# Patient Record
(demographics unavailable — no encounter records)

---

## 2024-11-05 NOTE — DISCUSSION/SUMMARY
[FreeTextEntry1] : 1.  Shortness of breath: EKG reviewed normal sinus rhythm with anterior fascicular block.  Going to rule out ischemic heart disease in this middle-aged male with known cardiovascular disease and exertional shortness of breath.  Will advise once results of exercise nuclear stress test is known as well as an echocardiogram. 2.  Diabetes: Advised to continue oral hypoglycemics and following up with primary care physician 3.  Hypertension: Adequate control on current medication advised to continue 4.  Intermittent claudication: Will refer for a repeat arterial Doppler bilateral lower extremities and will advise once results are known.  For now continue aspirin 5.  Hyperlipidemia: Continue statin [EKG obtained to assist in diagnosis and management of assessed problem(s)] : EKG obtained to assist in diagnosis and management of assessed problem(s)

## 2024-11-05 NOTE — PHYSICAL EXAM

## 2024-11-05 NOTE — HISTORY OF PRESENT ILLNESS
[FreeTextEntry1] : 48-year-old male with past medical history of peripheral vascular disease status post balloon angioplasty of the left femoral artery, hypertension hyperlipidemia comes in for evaluation of exertional shortness of breath.  Patient reports shortness of breath described mostly as difficulty taking a deep breath occurs while seated or lying down rarely when upright and working out.  Patient works out a few days a week without exacerbation of symptoms nor association with chest discomfort PND orthopnea

## 2025-01-31 NOTE — ADDENDUM
[FreeTextEntry1] : Next Visit: PVR, Uroflow  Entered by Snehal Milton, acting as scribe and chaperone for Dr. Donald Lim.   The documentation recorded by the scribe accurately reflects the service I personally performed and the decisions made by me.

## 2025-01-31 NOTE — PHYSICAL EXAM
[Normal Appearance] : normal appearance [Well Groomed] : well groomed [General Appearance - In No Acute Distress] : no acute distress [Edema] : no peripheral edema [Respiration, Rhythm And Depth] : normal respiratory rhythm and effort [Abdomen Soft] : soft [Exaggerated Use Of Accessory Muscles For Inspiration] : no accessory muscle use [Abdomen Tenderness] : non-tender [Costovertebral Angle Tenderness] : no ~M costovertebral angle tenderness [Urinary Bladder Findings] : the bladder was normal on palpation [Normal Station and Gait] : the gait and station were normal for the patient's age [] : no rash [No Focal Deficits] : no focal deficits [Oriented To Time, Place, And Person] : oriented to person, place, and time [Affect] : the affect was normal [Mood] : the mood was normal [No Palpable Adenopathy] : no palpable adenopathy [Urethral Meatus] : meatus normal [Penis Abnormality] : normal circumcised penis [Scrotum] : the scrotum was normal [Epididymis] : the epididymides were normal [Testes Tenderness] : no tenderness of the testes [Testes Mass (___cm)] : there were no testicular masses [de-identified] : some discomfort on palpation of distal dorsal penile skin - no erythema, lesions

## 2025-01-31 NOTE — LETTER BODY
[Dear  ___] : Dear  [unfilled], [Courtesy Letter:] : I had the pleasure of seeing your patient, [unfilled], in my office today. [Please see my note below.] : Please see my note below. [Consult Closing:] : Thank you very much for allowing me to participate in the care of this patient.  If you have any questions, please do not hesitate to contact me. [Sincerely,] : Sincerely, [FreeTextEntry3] : Donald Lim MD

## 2025-01-31 NOTE — PHYSICAL EXAM
[Normal Appearance] : normal appearance [Well Groomed] : well groomed [General Appearance - In No Acute Distress] : no acute distress [Edema] : no peripheral edema [Respiration, Rhythm And Depth] : normal respiratory rhythm and effort [Exaggerated Use Of Accessory Muscles For Inspiration] : no accessory muscle use [Abdomen Soft] : soft [Abdomen Tenderness] : non-tender [Costovertebral Angle Tenderness] : no ~M costovertebral angle tenderness [Urinary Bladder Findings] : the bladder was normal on palpation [Normal Station and Gait] : the gait and station were normal for the patient's age [] : no rash [No Focal Deficits] : no focal deficits [Oriented To Time, Place, And Person] : oriented to person, place, and time [Affect] : the affect was normal [Mood] : the mood was normal [No Palpable Adenopathy] : no palpable adenopathy [Urethral Meatus] : meatus normal [Penis Abnormality] : normal circumcised penis [Scrotum] : the scrotum was normal [Epididymis] : the epididymides were normal [Testes Tenderness] : no tenderness of the testes [Testes Mass (___cm)] : there were no testicular masses [de-identified] : some discomfort on palpation of distal dorsal penile skin - no erythema, lesions

## 2025-01-31 NOTE — ASSESSMENT
[FreeTextEntry1] : Patient has persistent pain in the shaft of his penis with erections. I encouraged him to retry lidocaine cream and to use it 15-20 minutes before sexual activity. If he has no resolve, we will refer him to dermatology. If all remains stable, he will follow-up in 6 months to reevaluate.

## 2025-01-31 NOTE — HISTORY OF PRESENT ILLNESS
[FreeTextEntry1] : Patient comes in with no new voiding complaints. He does still report pain mostly in the distal penile skin with all erections. He reports exacerbation of pain during sexual activity and squeezing. He denies pain post erections. He used the lidocaine cream once with some resolve in his pain. He is voiding with an adequate stream, nocturia x2, no dysuria or hematuria.

## 2025-07-02 NOTE — PHYSICAL EXAM

## 2025-07-02 NOTE — DISCUSSION/SUMMARY
[FreeTextEntry1] : 1.  Chest discomfort: EKG reviewed sinus rhythm unchanged from prior tracings.  This patient had an ischemic workup within the year prefer that he see GI which is scheduled to do for endoscopy.  If symptoms persist we will consider CTA versus coronary angiography. 2.  Hypertension: Well-controlled with diet 3.  Hyperlipidemia: Continue statin 4.  Diabetes: Continue oral hypoglycemics follow-up with PCP for routine medical matters and blood work. 5.  PVD: Continue aspirin [EKG obtained to assist in diagnosis and management of assessed problem(s)] : EKG obtained to assist in diagnosis and management of assessed problem(s)

## 2025-07-02 NOTE — HISTORY OF PRESENT ILLNESS
[FreeTextEntry1] : 49-year-old male with a past medical history of peripheral vascular disease diabetes comes in for evaluation of chest discomfort.  Patient reports a few weeks of a left upper chest discomfort poorly described that comes and goes usually lasting no more than a minute.  Not necessarily during physical activity.  No associated shortness of breath PND orthopnea.  Patient works out routinely does resistance training without exacerbation of symptoms.  He did undergo a nuclear stress test in November 2024 which was negative for ischemic heart disease at that time also underwent an echocardiogram which showed mild valvular heart disease no significant pathology.